# Patient Record
Sex: FEMALE | Race: WHITE | NOT HISPANIC OR LATINO | URBAN - METROPOLITAN AREA
[De-identification: names, ages, dates, MRNs, and addresses within clinical notes are randomized per-mention and may not be internally consistent; named-entity substitution may affect disease eponyms.]

---

## 2022-06-26 ENCOUNTER — HOSPITAL ENCOUNTER (EMERGENCY)
Facility: HOSPITAL | Age: 22
Discharge: HOME | End: 2022-06-26
Attending: EMERGENCY MEDICINE

## 2022-06-26 VITALS
BODY MASS INDEX: 22.88 KG/M2 | HEART RATE: 70 BPM | WEIGHT: 142.38 LBS | TEMPERATURE: 98 F | DIASTOLIC BLOOD PRESSURE: 67 MMHG | RESPIRATION RATE: 16 BRPM | SYSTOLIC BLOOD PRESSURE: 111 MMHG | HEIGHT: 66 IN | OXYGEN SATURATION: 98 %

## 2022-06-26 DIAGNOSIS — T78.40XA ALLERGIC REACTION, INITIAL ENCOUNTER: ICD-10-CM

## 2022-06-26 DIAGNOSIS — R21 RASH: Primary | ICD-10-CM

## 2022-06-26 LAB
B-HCG UR QL: NEGATIVE
CTP QC/QA: YES

## 2022-06-26 PROCEDURE — 99283 EMERGENCY DEPT VISIT LOW MDM: CPT | Mod: ER

## 2022-06-26 PROCEDURE — 81025 URINE PREGNANCY TEST: CPT | Mod: ER | Performed by: EMERGENCY MEDICINE

## 2022-06-26 PROCEDURE — 63600175 PHARM REV CODE 636 W HCPCS: Mod: ER | Performed by: EMERGENCY MEDICINE

## 2022-06-26 RX ORDER — DIPHENHYDRAMINE HCL 25 MG
25 CAPSULE ORAL EVERY 6 HOURS PRN
Qty: 20 CAPSULE | Refills: 0 | Status: SHIPPED | OUTPATIENT
Start: 2022-06-26

## 2022-06-26 RX ORDER — PREDNISONE 20 MG/1
40 TABLET ORAL DAILY
Qty: 6 TABLET | Refills: 0 | Status: SHIPPED | OUTPATIENT
Start: 2022-06-26 | End: 2022-06-29

## 2022-06-26 RX ORDER — DEXAMETHASONE 4 MG/1
8 TABLET ORAL
Status: COMPLETED | OUTPATIENT
Start: 2022-06-26 | End: 2022-06-26

## 2022-06-26 RX ADMIN — DEXAMETHASONE 8 MG: 4 TABLET ORAL at 03:06

## 2022-06-26 NOTE — ED PROVIDER NOTES
Encounter Date: 6/26/2022    SCRIBE #1 NOTE: I, Flavia Johnston, am scribing for, and in the presence of,  Oz Onofre MD. I have scribed the following portions of the note - Other sections scribed: HPI; ROS.       History     Chief Complaint   Patient presents with    Rash      Started last night after taking a shower no new products. Started on face and neck and this morning has come down to arm. Has take benadryl and nothing . Also states throat itchy and nose feels swollen     Karen Jaramillo is a 21 y.o. female with no known medical Hx who presents to the ED for chief complaint of an itchy and tingling rash to the posterior neck, face, and upper extremities onset 1 day ago. Patient reports she was taking a shower 1 day ago when she noticed the rash on her face and neck. She states when she woke up this morning the rash had spread to her arms. Patient reports her throat feels scratchy and her nose feels swollen. She states she has not used any new products or consumed any new foods. Patient denies shortness of breath, or abdominal pain. She reports she is allergic to latex, but has not come in contact with it.      The history is provided by the patient. No  was used.     Review of patient's allergies indicates:   Allergen Reactions    Latex, natural rubber Rash     History reviewed. No pertinent past medical history.  No past surgical history on file.  History reviewed. No pertinent family history.     Review of Systems   Constitutional: Negative.    HENT: Negative.    Eyes: Negative.    Respiratory: Negative.    Cardiovascular: Negative.    Gastrointestinal: Negative.    Genitourinary: Negative.    Musculoskeletal: Negative.    Skin: Positive for rash.   Neurological: Negative.        Physical Exam     Initial Vitals [06/26/22 1407]   BP Pulse Resp Temp SpO2   121/81 92 18 99.1 °F (37.3 °C) 95 %      MAP       --         Physical Exam    Nursing note and vitals  reviewed.  Constitutional: She appears well-developed and well-nourished. She is not diaphoretic. No distress.   HENT:   Head: Normocephalic and atraumatic.   Eyes: Pupils are equal, round, and reactive to light. Right eye exhibits no discharge. Left eye exhibits no discharge.   Neck: No tracheal deviation present.   Normal range of motion.  Cardiovascular: Normal rate and regular rhythm.   Pulmonary/Chest: No stridor. No respiratory distress.   Musculoskeletal:         General: No tenderness or edema. Normal range of motion.      Cervical back: Normal range of motion.     Neurological: She is alert and oriented to person, place, and time.   Skin: Skin is warm and dry. Rash (Mild erythematous rash over bilateral antecubital fossa, erythema noted over right paracervical area bilaterally) noted.         ED Course   Procedures  Labs Reviewed   POCT URINE PREGNANCY          Imaging Results    None          Medications   dexAMETHasone tablet 8 mg (8 mg Oral Given 6/26/22 1507)     Medical Decision Making:   History:   Old Medical Records: I decided to obtain old medical records.  Clinical Tests:   Lab Tests: Ordered and Reviewed    MDM:    21-year-old female with no reported past medical history presenting with rash.  Rash appears urticarial, mildly erythematous.  Unable to identify a trigger with patient, no signs of anaphylaxis.  Will treat symptomatically with steroids, Benadryl p.r.n. follow-up as needed in clinic. Discussed diagnosis and further treatment with patient, including f/u.  Return precautions given and all questions answered.  Patient in understanding of plan.  Pt discharged to home improved and stable.              Scribe Attestation:   Scribe #1: I performed the above scribed service and the documentation accurately describes the services I performed. I attest to the accuracy of the note.               Scribe attestation: I, Oz Onofre M.D., personally performed the services described in this  documentation.  All medical record entries made by the scribe were at my direction and in my presence.  I have reviewed the chart and agree that the record reflects my personal performance and is accurate and complete.    Clinical Impression:   Final diagnoses:  [R21] Rash (Primary)  [T78.40XA] Allergic reaction, initial encounter          ED Disposition Condition    Discharge Stable        ED Prescriptions     Medication Sig Dispense Start Date End Date Auth. Provider    diphenhydrAMINE (BENADRYL) 25 mg capsule Take 1 capsule (25 mg total) by mouth every 6 (six) hours as needed for Itching or Allergies. 20 capsule 6/26/2022  Oz Onofre MD    predniSONE (DELTASONE) 20 MG tablet Take 2 tablets (40 mg total) by mouth once daily. for 3 days 6 tablet 6/26/2022 6/29/2022 Oz Onofre MD        Follow-up Information     Follow up With Specialties Details Why Contact Info    [facility] ED Emergency Medicine Go to  If symptoms worsen [id number] [facility] [id number] [phone]           Oz Onofre MD  06/26/22 [id number]

## 2022-06-26 NOTE — FIRST PROVIDER EVALUATION
"Medical screening exam completed.  I have conducted a focused provider triage encounter, findings are as follows:    Brief history of present illness:  Rash to face, neck, and arms noticed last night; tried Benadryl with no relief    Vitals:    06/26/22 1407   BP: 121/81   BP Location: Right arm   Patient Position: Sitting   Pulse: 92   Resp: 18   Temp: 99.1 °F (37.3 °C)   TempSrc: Oral   SpO2: 95%   Weight: 64.6 kg (142 lb 6.4 oz)   Height: 5' 6" (1.676 m)       Pertinent physical exam: erythematous rash to face, BUE, and neck; NAD    Brief workup plan:  UPT    Preliminary workup initiated; this workup will be continued and followed by the physician or advanced practice provider that is assigned to the patient when roomed.  "